# Patient Record
Sex: MALE | Race: WHITE | NOT HISPANIC OR LATINO | Employment: UNEMPLOYED | URBAN - METROPOLITAN AREA
[De-identification: names, ages, dates, MRNs, and addresses within clinical notes are randomized per-mention and may not be internally consistent; named-entity substitution may affect disease eponyms.]

---

## 2021-01-18 ENCOUNTER — APPOINTMENT (OUTPATIENT)
Dept: GENERAL RADIOLOGY | Facility: HOSPITAL | Age: 26
End: 2021-01-18

## 2021-01-18 ENCOUNTER — HOSPITAL ENCOUNTER (EMERGENCY)
Facility: HOSPITAL | Age: 26
Discharge: HOME | End: 2021-01-18
Attending: EMERGENCY MEDICINE | Admitting: EMERGENCY MEDICINE

## 2021-01-18 VITALS
HEART RATE: 98 BPM | RESPIRATION RATE: 18 BRPM | SYSTOLIC BLOOD PRESSURE: 150 MMHG | BODY MASS INDEX: 25.73 KG/M2 | DIASTOLIC BLOOD PRESSURE: 100 MMHG | WEIGHT: 190 LBS | OXYGEN SATURATION: 98 % | HEIGHT: 72 IN | TEMPERATURE: 99 F

## 2021-01-18 DIAGNOSIS — S89.91XA INJURY OF RIGHT KNEE, INITIAL ENCOUNTER: Primary | ICD-10-CM

## 2021-01-18 DIAGNOSIS — M25.469 SUPRAPATELLAR EFFUSION OF KNEE: ICD-10-CM

## 2021-01-18 PROCEDURE — 99282 EMERGENCY DEPT VISIT SF MDM: CPT

## 2021-01-18 PROCEDURE — 73562 X-RAY EXAM OF KNEE 3: CPT

## 2021-01-18 PROCEDURE — 99283 EMERGENCY DEPT VISIT LOW MDM: CPT | Performed by: PHYSICIAN ASSISTANT

## 2021-01-19 NOTE — ED PROVIDER NOTES
" EMERGENCY DEPARTMENT ENCOUNTER      Room Number: 08/08    History is provided by the patient, no translation services needed    HPI:    Chief complaint: Knee injury    Location: Right knee    Quality/Severity: Moderate to severe pain    Timing/Duration: Injury occurred Saturday evening    Modifying Factors: Pain increases with movement, feels better when propped up on a pillow with knee slightly flexed.  Unable to fully extend or fully flex right knee.    Associated Symptoms: Positive for right knee pain with slight swelling.  Denies any other injuries    Narrative: Pt is a 25 y.o. male who presents complaining of injury to right knee that occurred on Saturday evening.  Patient states he was wrestling with a friend and felt his knee \"pop out to the side,\" denies any previous injuries to this knee.  Denies any numbness or tingling.  He states he is able to bear weight and ambulate on the leg without difficulty.  Attempting to straighten the knee completely causes him a great deal of pain.       PMD: Provider, No Known    REVIEW OF SYSTEMS  Review of Systems   Constitutional: Negative for chills and fever.   Respiratory: Negative for cough and shortness of breath.    Cardiovascular: Negative for chest pain and palpitations.   Musculoskeletal: Positive for arthralgias and joint swelling.   Skin: Negative for pallor and wound.   Neurological: Negative for dizziness, syncope and numbness.   Psychiatric/Behavioral: Negative for confusion. The patient is not nervous/anxious.          PAST MEDICAL HISTORY  Active Ambulatory Problems     Diagnosis Date Noted   • No Active Ambulatory Problems     Resolved Ambulatory Problems     Diagnosis Date Noted   • No Resolved Ambulatory Problems     No Additional Past Medical History       PAST SURGICAL HISTORY  History reviewed. No pertinent surgical history.    FAMILY HISTORY  History reviewed. No pertinent family history.    SOCIAL HISTORY  Social History     Socioeconomic History "   • Marital status: Unknown     Spouse name: Not on file   • Number of children: Not on file   • Years of education: Not on file   • Highest education level: Not on file   Tobacco Use   • Smoking status: Never Smoker   • Smokeless tobacco: Never Used   Substance and Sexual Activity   • Alcohol use: Never     Frequency: Never   • Drug use: Never   • Sexual activity: Defer       ALLERGIES  Patient has no known allergies.    No current facility-administered medications for this encounter.   No current outpatient medications on file.    PHYSICAL EXAM  ED Triage Vitals [01/18/21 1926]   Temp Heart Rate Resp BP SpO2   99 °F (37.2 °C) 98 18 150/100 98 %      Temp src Heart Rate Source Patient Position BP Location FiO2 (%)   -- -- Sitting Left arm --       Physical Exam   Constitutional: He is oriented to person, place, and time and well-developed, well-nourished, and in no distress.   HENT:   Head: Normocephalic and atraumatic.   Eyes: Pupils are equal, round, and reactive to light. Conjunctivae are normal.   Cardiovascular: Normal rate, regular rhythm and intact distal pulses.   Pulmonary/Chest: Effort normal. No respiratory distress.   Musculoskeletal:         General: No edema.      Right knee: He exhibits decreased range of motion and swelling. He exhibits no ecchymosis, no deformity and normal patellar mobility. Tenderness found.      Comments: Dorsal pedal and posterior tibial pulses are 2+ in right lower extremity.  Normal sensation.   Neurological: He is alert and oriented to person, place, and time. GCS score is 15.   Skin: Skin is warm and dry.   Psychiatric: Mood, memory, affect and judgment normal.   Nursing note and vitals reviewed.        LAB RESULTS  Lab Results (last 24 hours)     ** No results found for the last 24 hours. **            I ordered the above labs and reviewed the results    RADIOLOGY  Xr Knee 3 View Right    Result Date: 1/18/2021  CR Knee 3 Vws RT INDICATION: Right knee pain and stiffness  after wrestling on Saturday night COMPARISON: None available. FINDINGS: 3 view(s) of the right knee. No fracture or dislocation. A small effusion is noted in the suprapatella bursa. Joint spaces are maintained. No unexpected radiopaque foreign bodies.     Small effusion noted in the suprapatella bursa. Clinical aspects of the case will determine if MR of the right knee could provide additional information. Signer Name: Chuy Winters MD  Signed: 1/18/2021 8:03 PM  Workstation Name: [id number]  [facility]      I ordered the above radiologic testing and reviewed the results    PROCEDURES  Procedures      PROGRESS AND CONSULTS  ED Course as of Jan 18 2022   Mon Jan 18, 2021   2019 Discussed x-rays with patient.  There are no acute bony abnormalities on imaging, it does show a small suprapatellar effusion.  Patient's exam is concerning for meniscus tear or ligamentous injury, he is not a good candidate for a knee immobilizer since he is unable to fully extend the knee and I believe that this will cause him increased pain.  We will go ahead and apply an Ace wrap, and I have given him follow-up information for orthopedics.  Recommended he see them for further evaluation and treatment.  I recommended NSAIDs such as Motrin or naproxen for pain, as well as rest, ice, compression, and elevation.  Patient verbalizes understanding and is agreeable with this plan.    [KS]      ED Course User Index  [KS] Jennifer Mcfadden PA-C           MEDICAL DECISION MAKING  Results were reviewed/discussed with the patient and they were also made aware of online access. Pt also made aware that some labs, such as cultures, will not be resulted during ER visit and follow up with PMD is necessary.     MDM       My diagnosis for lower extremity pain and injury includes but is not limited to hip fracture, femur fracture, hip dislocation, hip contusion, hip sprain, hip strain, pelvic fracture, knee sprain, patella  dislocation, knee dislocation, internal derangement of knee, fractures of the femur, tibia, fibula, ankle, foot and digits, ankle sprain, ankle dislocation, Lisfranc fracture, fracture dislocations of the digits, pulmonary embolism, claudication, peripheral vascular disease, gout, osteoarthritis, rheumatoid arthritis, bursitis, septic joint, poly-rheumatica, polyarthralgia and other inflammatory or infectious disease processes.      DIAGNOSIS  Final diagnoses:   Injury of right knee, initial encounter   Suprapatellar effusion of knee       Latest Documented Vital Signs:  As of 20:22 EST  BP- 150/100 HR- 98 Temp- 99 °F (37.2 °C) O2 sat- 98%    DISPOSITION  Patient discharged home.    Discussed pertinent imaging findings with the patient/family.  Patient/Family voiced understanding of need to follow-up for recheck, further testing as needed.  Return to the emergency Department warnings were given.         Medication List      No changes were made to your prescriptions during this visit.             Follow-up Information     Josephine Holman APRN. Call in 1 day.    Specialties: Nurse Practitioner, Orthopedic Surgery  Why: To schedule follow-up appointment  Contact information:  [address] [place] [id number] [phone]                     Dictated utilizing Dragon dictation     Jennifer Mcfadden PA-C  01/18/21 2031

## 2021-01-22 ENCOUNTER — OFFICE VISIT (OUTPATIENT)
Dept: ORTHOPEDIC SURGERY | Facility: CLINIC | Age: 26
End: 2021-01-22

## 2021-01-22 VITALS
SYSTOLIC BLOOD PRESSURE: 163 MMHG | HEIGHT: 72 IN | WEIGHT: 190 LBS | DIASTOLIC BLOOD PRESSURE: 80 MMHG | BODY MASS INDEX: 25.73 KG/M2 | HEART RATE: 73 BPM

## 2021-01-22 DIAGNOSIS — M25.561 MECHANICAL KNEE PAIN, RIGHT: Primary | ICD-10-CM

## 2021-01-22 PROCEDURE — 99213 OFFICE O/P EST LOW 20 MIN: CPT | Performed by: NURSE PRACTITIONER

## 2021-01-22 RX ORDER — MELOXICAM 15 MG/1
15 TABLET ORAL DAILY
Qty: 30 TABLET | Refills: 0 | Status: SHIPPED | OUTPATIENT
Start: 2021-01-22 | End: 2022-05-05

## 2021-01-22 NOTE — PROGRESS NOTES
Subjective:     Patient ID: Ray Diana is a 25 y.o. male.    Chief Complaint:  Right knee pain, new patient to examiner  History of Present Illness   Ray Diana presents to clinic for evaluation right knee. Injury occurred on 1/17/2021.  He was wrestling with the person he was wrestling hit the medial aspect of the knee.  Presented the following day to [facility]-ray images were completed.  He did notice a pop at the medial aspect of the knee followed by acute swelling and pain.  He does feel as if the knee is getting give out on him.  Is also experiencing pain at the lateral aspect of the knee.  Denies prior injury to the knee in the past.  Denies at this time numbness or tingling radiating down the right lower extremity.  Was experiencing tingling sensation immediately after injury but symptoms have subsided.  Rates discomfort 5-6 out of a 10 mainly aching in nature.  X-ray imaging available for viewing in chart.  Denies any prior MRI.  Increased pain noted with activities involving deep flexion such as with bending down and flexing the knee when attempting to straighten the knee completely.  Not currently taking any anti-inflammatory medications for symptom relief.  Is continued weightbearing as tolerated.  Pain is not rating to the groin or to the lateral aspect of the hip.  Currently off work secondary to pandemic.  He works as a  works around [employer] systems.  Denies other concerns present time.    Social History     Occupational History   • Not on file   Tobacco Use   • Smoking status: Never Smoker   • Smokeless tobacco: Never Used   Substance and Sexual Activity   • Alcohol use: Never     Frequency: Never   • Drug use: Never   • Sexual activity: Defer      History reviewed. No pertinent past medical history.  History reviewed. No pertinent surgical history.    History reviewed. No pertinent family history.      Review of Systems   Constitutional: Negative for chills, diaphoresis,  "fever and unexpected weight change.   HENT: Negative for hearing loss, nosebleeds, sore throat and tinnitus.    Eyes: Negative for pain and visual disturbance.   Respiratory: Negative for cough, shortness of breath and wheezing.    Cardiovascular: Negative for chest pain and palpitations.   Gastrointestinal: Negative for abdominal pain, diarrhea, nausea and vomiting.   Endocrine: Negative for cold intolerance, heat intolerance and polydipsia.   Genitourinary: Negative for difficulty urinating, dysuria and hematuria.   Musculoskeletal: Positive for joint swelling and myalgias. Negative for arthralgias.   Skin: Negative for rash and wound.   Allergic/Immunologic: Negative for environmental allergies.   Neurological: Negative for dizziness, syncope and numbness.   Hematological: Does not bruise/bleed easily.   Psychiatric/Behavioral: Negative for dysphoric mood and sleep disturbance. The patient is not nervous/anxious.            Objective:  Physical Exam    Vital signs reviewed.   General: No acute distress.  Eyes: conjunctiva clear; pupils equally round and reactive  ENT: external ears and nose atraumatic; oropharynx clear  CV: no peripheral edema  Resp: normal respiratory effort  Skin: no rashes or wounds; normal turgor  Psych: mood and affect appropriate; recent and remote memory intact    Vitals:    01/22/21 0941   BP: 163/80   BP Location: Left arm   Pulse: 73   Weight: 86.2 kg (190 lb)   Height: 182.9 cm (72\")         01/22/21  0941   Weight: 86.2 kg (190 lb)     Body mass index is 25.77 kg/m².      Right Knee Exam     Tenderness   The patient is experiencing tenderness in the medial joint line and MCL.    Range of Motion   Extension: 0   Flexion: 130     Tests   Jean:  Medial - negative Lateral - negative  Varus: negative Valgus: negative  Lachman:  Anterior - 1+    Posterior - negative  Drawer:  Anterior - positive    Posterior - negative  Patellar apprehension: negative    Other   Erythema: " absent  Sensation: normal  Pulse: present  Swelling: mild    Comments:  Negative active patellar compression test  Negative logroll exam       Left Knee Exam     Tests   Drawer:  Anterior - negative     Posterior - negative                 Imaging:  Xr Knee 3 View Right    Result Date: 1/18/2021  Small effusion noted in the suprapatella bursa. Clinical aspects of the case will determine if MR of the right knee could provide additional information. Signer Name: Chuy Winters MD  Signed: 1/18/2021 8:03 PM  Workstation Name: [id number]  [facility]    Independently reviewed three-view x-ray imaging right knee previously completed BH lag 1/18/2020 indication right knee pain and stiffness  Mild knee effusion no areas of high-grade osteoarthritic changes no acute fracture dislocation noted  Assessment:        1. Mechanical knee pain, right           Plan:  1. Discussed plan of care with patient.  Dry-King hinged brace applied the right knee for support stability.  Plan to proceed with MRI.  Awaiting authorized insurance prior to MRI.  Will start meloxicam 1 tablet once daily.  Provided him with home strengthening exercises to begin.  Plan to follow-up with him in clinic after MRI has been completed to discuss results and further plan of care.  Verbalized understanding of all information agrees with plan of care.  Denies other concerns present time.  Orders:  Orders Placed This Encounter   Procedures   • MRI Knee Right Without Contrast       Medications:  New Medications Ordered This Visit   Medications   • meloxicam (MOBIC) 15 MG tablet     Sig: Take 1 tablet by mouth Daily.     Dispense:  30 tablet     Refill:  0       Followup:  No follow-ups on file.    Diagnoses and all orders for this visit:    1. Mechanical knee pain, right (Primary)  -     MRI Knee Right Without Contrast; Future    Other orders  -     meloxicam (MOBIC) 15 MG tablet; Take 1 tablet by mouth Daily.  Dispense: 30 tablet;  Refill: 0          I ordered and reviewed the MAYTE today.     Dictated utilizing Dragon dictation

## 2021-02-09 ENCOUNTER — HOSPITAL ENCOUNTER (OUTPATIENT)
Dept: MRI IMAGING | Facility: HOSPITAL | Age: 26
End: 2021-02-09